# Patient Record
Sex: FEMALE | Race: WHITE | NOT HISPANIC OR LATINO | Employment: UNEMPLOYED | URBAN - METROPOLITAN AREA
[De-identification: names, ages, dates, MRNs, and addresses within clinical notes are randomized per-mention and may not be internally consistent; named-entity substitution may affect disease eponyms.]

---

## 2019-11-14 DIAGNOSIS — S39.93XA: Primary | ICD-10-CM

## 2019-11-14 DIAGNOSIS — S30.814A: ICD-10-CM

## 2019-11-26 ENCOUNTER — OFFICE VISIT (OUTPATIENT)
Dept: OBSTETRICS AND GYNECOLOGY | Facility: CLINIC | Age: 74
End: 2019-11-26
Payer: MEDICARE

## 2019-11-26 VITALS
DIASTOLIC BLOOD PRESSURE: 62 MMHG | WEIGHT: 138 LBS | BODY MASS INDEX: 24.45 KG/M2 | SYSTOLIC BLOOD PRESSURE: 102 MMHG | HEIGHT: 63 IN

## 2019-11-26 DIAGNOSIS — S39.93XA INJURY OF VAGINA, INITIAL ENCOUNTER: Primary | ICD-10-CM

## 2019-11-26 PROCEDURE — 99204 OFFICE O/P NEW MOD 45 MIN: CPT | Mod: S$GLB,,, | Performed by: OBSTETRICS & GYNECOLOGY

## 2019-11-26 PROCEDURE — 1159F MED LIST DOCD IN RCRD: CPT | Mod: S$GLB,,, | Performed by: OBSTETRICS & GYNECOLOGY

## 2019-11-26 PROCEDURE — 99204 PR OFFICE/OUTPT VISIT, NEW, LEVL IV, 45-59 MIN: ICD-10-PCS | Mod: S$GLB,,, | Performed by: OBSTETRICS & GYNECOLOGY

## 2019-11-26 PROCEDURE — 1159F PR MEDICATION LIST DOCUMENTED IN MEDICAL RECORD: ICD-10-PCS | Mod: S$GLB,,, | Performed by: OBSTETRICS & GYNECOLOGY

## 2019-11-26 RX ORDER — SODIUM FLUORIDE 5 MG/G
GEL, DENTIFRICE DENTAL
COMMUNITY
Start: 2019-09-23

## 2019-11-26 RX ORDER — TRAMADOL HYDROCHLORIDE 50 MG/1
TABLET ORAL
COMMUNITY
Start: 2019-10-23

## 2019-11-26 RX ORDER — PRASUGREL 10 MG/1
TABLET, FILM COATED ORAL
COMMUNITY
Start: 2019-11-11

## 2019-11-26 RX ORDER — ONDANSETRON HYDROCHLORIDE 8 MG/1
TABLET, FILM COATED ORAL
COMMUNITY
Start: 2019-11-06

## 2019-11-26 RX ORDER — MIDODRINE HYDROCHLORIDE 5 MG/1
TABLET ORAL
COMMUNITY
Start: 2019-11-11

## 2019-11-26 RX ORDER — ATORVASTATIN CALCIUM 80 MG/1
TABLET, FILM COATED ORAL
COMMUNITY
Start: 2019-11-11

## 2019-11-26 RX ORDER — CIPROFLOXACIN 250 MG/1
TABLET, FILM COATED ORAL
COMMUNITY
Start: 2019-11-18

## 2019-11-26 RX ORDER — GABAPENTIN 600 MG/1
TABLET ORAL
COMMUNITY
Start: 2019-11-11

## 2019-11-26 RX ORDER — TRAZODONE HYDROCHLORIDE 100 MG/1
TABLET ORAL
COMMUNITY
Start: 2019-10-28

## 2019-11-26 RX ORDER — MUPIROCIN 20 MG/G
OINTMENT TOPICAL
COMMUNITY
Start: 2019-08-19

## 2019-11-26 RX ORDER — SERTRALINE HYDROCHLORIDE 100 MG/1
TABLET, FILM COATED ORAL
COMMUNITY
Start: 2019-10-29

## 2019-11-26 RX ORDER — TIZANIDINE 4 MG/1
TABLET ORAL
COMMUNITY
Start: 2019-11-08

## 2019-11-26 RX ORDER — ESOMEPRAZOLE MAGNESIUM 40 MG/1
CAPSULE, DELAYED RELEASE ORAL
COMMUNITY
Start: 2019-10-28

## 2019-11-26 NOTE — PROGRESS NOTES
Subjective:       Patient ID: Colleen Mcdonough is a 74 y.o. female.    Chief Complaint:  Vaginal Bleeding      History of Present Illness  Patient is a 74-year-old  paraplegic with a recent history of vaginal bleeding from a suspected ureteral/vaginal tear. Was hospitalized due to heavy bleeding. Had CT scan at OSH. Pt states she is having some vaginal pain but has not had bleeding since the hospital. Denies any new complaints today     OBHX:  vaginal   GYN: denies abnormal pap or STDs     Past Medical History:   Diagnosis Date    Hyperlipidemia     Paraplegia      Past Surgical History:   Procedure Laterality Date    APPENDECTOMY      HIP SURGERY      right    HYSTERECTOMY      NECK SURGERY      broken neck    TONSILLECTOMY         Current Outpatient Medications:     atorvastatin (LIPITOR) 80 MG tablet, , Disp: , Rfl:     ciprofloxacin HCl (CIPRO) 250 MG tablet, , Disp: , Rfl:     conjugated estrogens (PREMARIN) vaginal cream, Place 1 g vaginally once daily., Disp: 30 g, Rfl: 1    esomeprazole (NEXIUM) 40 MG capsule, , Disp: , Rfl:     gabapentin (NEURONTIN) 600 MG tablet, , Disp: , Rfl:     midodrine (PROAMATINE) 5 MG Tab, , Disp: , Rfl:     mupirocin (BACTROBAN) 2 % ointment, , Disp: , Rfl:     ondansetron (ZOFRAN) 8 MG tablet, , Disp: , Rfl:     prasugrel (EFFIENT) 10 mg Tab, , Disp: , Rfl:     PREVIDENT 1.1 % Gel, , Disp: , Rfl:     sertraline (ZOLOFT) 100 MG tablet, , Disp: , Rfl:     tiZANidine (ZANAFLEX) 4 MG tablet, , Disp: , Rfl:     traMADol (ULTRAM) 50 mg tablet, , Disp: , Rfl:     traZODone (DESYREL) 100 MG tablet, , Disp: , Rfl:     Review of patient's allergies indicates:  No Known Allergies    Social History     Tobacco Use    Smoking status: Former Smoker   Substance Use Topics    Alcohol use: Yes     Frequency: Monthly or less           Review of Systems  Review of Systems   Constitutional: Negative for chills and fever.   Respiratory: Negative for shortness of  breath.    Cardiovascular: Negative for chest pain.   Gastrointestinal: Negative for abdominal pain, blood in stool, constipation, diarrhea, nausea, vomiting and reflux.   Genitourinary: Positive for vaginal pain. Negative for dysuria, hematuria, hot flashes, menorrhagia, menstrual problem, pelvic pain, vaginal bleeding, vaginal discharge, postcoital bleeding and vaginal dryness.   Musculoskeletal: Negative for arthralgias and joint swelling.   Integumentary:  Negative for rash, hair changes, breast mass, nipple discharge and breast skin changes.   Psychiatric/Behavioral: Negative for depression. The patient is not nervous/anxious.    Breast: Negative for asymmetry, lump, mass, nipple discharge and skin changes          Objective:    Physical Exam:   Constitutional: She appears well-developed and well-nourished. No distress.    HENT:   Head: Normocephalic and atraumatic.    Eyes: Conjunctivae and EOM are normal.    Neck: No tracheal deviation present. No thyromegaly present.    Cardiovascular: Exam reveals no clubbing, no cyanosis and no edema.     Pulmonary/Chest: Effort normal. No respiratory distress.        Abdominal: Soft. She exhibits no distension and no mass. There is no tenderness. There is no rebound and no guarding. No hernia.     Genitourinary: Rectum normal. Pelvic exam was performed with patient supine. There is no rash, tenderness, lesion or injury on the right labia. There is no rash, tenderness, lesion or injury on the left labia. There is erythema and tenderness in the vagina.                Skin: She is not diaphoretic. No cyanosis. Nails show no clubbing.            Assessment:        1. Injury of vagina, initial encounter                Plan:      Unable to do internal exam due to pt discomfort and positioning   Atrophic vaginitis noted   No evidence of tearing and no bleeding on exam   [place] for records from [facility] sent   Start premarin cream qHS for atrophy   RTC PRN

## 2024-12-03 ENCOUNTER — OUTSIDE PLACE OF SERVICE (OUTPATIENT)
Dept: PULMONOLOGY | Facility: CLINIC | Age: 79
End: 2024-12-03
Payer: MEDICARE